# Patient Record
Sex: MALE | Race: WHITE | NOT HISPANIC OR LATINO | Employment: FULL TIME | URBAN - METROPOLITAN AREA
[De-identification: names, ages, dates, MRNs, and addresses within clinical notes are randomized per-mention and may not be internally consistent; named-entity substitution may affect disease eponyms.]

---

## 2020-02-19 ENCOUNTER — OFFICE VISIT (OUTPATIENT)
Dept: INTERNAL MEDICINE CLINIC | Facility: CLINIC | Age: 35
End: 2020-02-19
Payer: COMMERCIAL

## 2020-02-19 VITALS
OXYGEN SATURATION: 97 % | DIASTOLIC BLOOD PRESSURE: 80 MMHG | HEART RATE: 86 BPM | BODY MASS INDEX: 32.06 KG/M2 | WEIGHT: 229 LBS | HEIGHT: 71 IN | SYSTOLIC BLOOD PRESSURE: 136 MMHG | RESPIRATION RATE: 16 BRPM | TEMPERATURE: 98.3 F

## 2020-02-19 DIAGNOSIS — K59.04 CHRONIC IDIOPATHIC CONSTIPATION: ICD-10-CM

## 2020-02-19 DIAGNOSIS — E66.9 OBESITY (BMI 30.0-34.9): ICD-10-CM

## 2020-02-19 DIAGNOSIS — Z00.00 ANNUAL PHYSICAL EXAM: Primary | ICD-10-CM

## 2020-02-19 DIAGNOSIS — F17.200 SMOKING: ICD-10-CM

## 2020-02-19 PROBLEM — K59.00 CONSTIPATION: Status: ACTIVE | Noted: 2020-02-19

## 2020-02-19 PROBLEM — Z88.0 PENICILLIN ALLERGY: Status: ACTIVE | Noted: 2020-02-19

## 2020-02-19 PROCEDURE — 3008F BODY MASS INDEX DOCD: CPT | Performed by: GENERAL ACUTE CARE HOSPITAL

## 2020-02-19 PROCEDURE — 99385 PREV VISIT NEW AGE 18-39: CPT | Performed by: GENERAL ACUTE CARE HOSPITAL

## 2020-02-19 PROCEDURE — 99214 OFFICE O/P EST MOD 30 MIN: CPT | Performed by: GENERAL ACUTE CARE HOSPITAL

## 2020-02-19 RX ORDER — DIPHENOXYLATE HYDROCHLORIDE AND ATROPINE SULFATE 2.5; .025 MG/1; MG/1
1 TABLET ORAL DAILY
COMMUNITY
End: 2020-08-31 | Stop reason: ALTCHOICE

## 2020-02-19 NOTE — PATIENT INSTRUCTIONS
For your constipation, eat more fiber, you could take over the counter colace (stool softener), fiber supplement  Drink plenty of water

## 2020-02-19 NOTE — PROGRESS NOTES
Assessment/Plan:  BMI Counseling: Body mass index is 32 39 kg/m²  The BMI is above normal  Nutrition recommendations include reducing intake of saturated and trans fat and reducing intake of cholesterol  Exercise recommendations include moderate physical activity 150 minutes/week  Smoking  Counseled on smoking cessation  He is still trying to get used to the new environment, many stresses, doesn't feel ready yet  Counseled him on the many options we have to help him stop smoking  Obesity (BMI 30 0-34  9)  Was able to lose 50+ ibs over the past 3 years  Counseled on diet and exercise  Constipation  BM 1-2 times/w  Drink plenty of water  Increase fiber intake  Take colace, fiber supplement  Annual physical exam  Declined flu shot  Received tetanus shot 8 years ago  Will check routine blood work  He mentions previous his thyroid number was abnormal so will check thyroid functio as well  Counseled on smoking cessation  Penicillin allergy  We talked about the chance of him growing out of it  I recommend get penicillin test from allergist  He'll think about it  Diagnoses and all orders for this visit:    Annual physical exam  -     HIV 1/2 AG-AB combo; Future  -     TSH, 3rd generation with Free T4 reflex; Future  -     CBC and differential; Future  -     Comprehensive metabolic panel; Future  -     Lipid panel; Future    Smoking    Obesity (BMI 30 0-34  9)    Chronic idiopathic constipation    Other orders  -     Cancel: TDAP VACCINE GREATER THAN OR EQUAL TO 8YO IM  -     Cancel: influenza vaccine, [id number], quadrivalent, 0 5 mL, preservative-free, for adult and pediatric patients 6 mos+ (AFLURIA, FLUARIX, FLULAVAL, FLUZONE)  -     multivitamin (THERAGRAN) TABS; Take 1 tablet by mouth daily          Subjective:      Patient ID: Bo Grubbs is a 30 y o  male  HPI    Here to establish care  No complain  Hasn't seen a doctor for years, wants to have blood work checked  Follows a keto diet  Working out a lot  3 years ago was 285Ibs now 229Ibs  Just moved here from [place] last Dec    Smokes 0 25ppd for 10y  Quitted for 6-7month last year then restarted due to stress  Bowel movement 1-2times/week  Thinks he is not taking enough fibers due to his diet  Hx penicillin allergy when he was a kid  Stayed in the hospital for several days  The following portions of the patient's history were reviewed and updated as appropriate: allergies, current medications, past family history, past medical history, past social history, past surgical history and problem list     Review of Systems   Constitutional: Negative for chills, fatigue and fever  HENT: Negative for congestion, nosebleeds, postnasal drip, sneezing, sore throat and trouble swallowing  Eyes: Negative for pain  Respiratory: Negative for cough, chest tightness, shortness of breath and wheezing  Cardiovascular: Negative for chest pain, palpitations and leg swelling  Gastrointestinal: Negative for abdominal pain, constipation, diarrhea, nausea and vomiting  Endocrine: Negative for cold intolerance, heat intolerance, polydipsia and polyuria  Genitourinary: Negative for difficulty urinating, dysuria, flank pain and hematuria  Musculoskeletal: Negative for arthralgias and myalgias  Skin: Negative for rash  Neurological: Negative for dizziness, tremors, weakness and headaches  Hematological: Does not bruise/bleed easily  Psychiatric/Behavioral: Negative for confusion and dysphoric mood  The patient is not nervous/anxious  Objective:      /80   Pulse 86   Temp 98 3 °F (36 8 °C) (Oral)   Resp 16   Ht 5' 10 5" (1 791 m)   Wt 104 kg (229 lb)   SpO2 97%   BMI 32 39 kg/m²          Physical Exam   Constitutional: He is oriented to person, place, and time  He appears well-developed and well-nourished  No distress  HENT:   Head: Normocephalic and atraumatic     Right Ear: External ear normal  Left Ear: External ear normal    Eyes: Conjunctivae are normal  No scleral icterus  Neck: Normal range of motion  Neck supple  No tracheal deviation present  No thyromegaly present  Cardiovascular: Normal rate, regular rhythm and normal heart sounds  Pulmonary/Chest: Effort normal and breath sounds normal  No respiratory distress  He has no wheezes  He has no rales  Abdominal: Soft  Bowel sounds are normal  There is no tenderness  There is no rebound and no guarding  Musculoskeletal: He exhibits no edema  Lymphadenopathy:     He has no cervical adenopathy  Neurological: He is alert and oriented to person, place, and time  Psychiatric: He has a normal mood and affect   His behavior is normal  Judgment and thought content normal

## 2020-02-19 NOTE — ASSESSMENT & PLAN NOTE
We talked about the chance of him growing out of it  I recommend get penicillin test from allergist  He'll think about it

## 2020-02-19 NOTE — ASSESSMENT & PLAN NOTE
Declined flu shot  Received tetanus shot 8 years ago  Will check routine blood work  He mentions previous his thyroid number was abnormal so will check thyroid functio as well  Counseled on smoking cessation

## 2020-02-19 NOTE — ASSESSMENT & PLAN NOTE
Counseled on smoking cessation  He is still trying to get used to the new environment, many stresses, doesn't feel ready yet  Counseled him on the many options we have to help him stop smoking

## 2020-02-20 LAB — EXTERNAL HIV SCREEN: NORMAL

## 2020-03-03 ENCOUNTER — TELEPHONE (OUTPATIENT)
Dept: INTERNAL MEDICINE CLINIC | Facility: CLINIC | Age: 35
End: 2020-03-03

## 2020-03-03 NOTE — TELEPHONE ENCOUNTER
I only see the result of HIV that was automatically released on 2/21 to [facility]  I am still waiting to see the rest of the labs I ordered (CBC, TSH, CMP, lipid, etc)  They appear as incomplete

## 2020-03-03 NOTE — TELEPHONE ENCOUNTER
The patient is looking for his lab work results from 2/20/20  The results were put into his chart under media  Would you mind reviewing these? Please advise  Thank you

## 2020-03-03 NOTE — TELEPHONE ENCOUNTER
Pls disregard the previous message  The rest of the labs were done in outside lab so I didn't see it showing up in chart  I'll have MA post it in his mychart  Thank you

## 2020-03-03 NOTE — TELEPHONE ENCOUNTER
Patient is requesting test results for labs done on 2/20  Pt would like results released to My Chart  Thank you

## 2020-03-03 NOTE — TELEPHONE ENCOUNTER
I have replied to him in MyChart of my interpretation of the labs, and sent msg for MA to send the result to him  He needs to come to see me to discuss his high cholesterol

## 2020-03-04 ENCOUNTER — OFFICE VISIT (OUTPATIENT)
Dept: INTERNAL MEDICINE CLINIC | Facility: CLINIC | Age: 35
End: 2020-03-04
Payer: COMMERCIAL

## 2020-03-04 VITALS
WEIGHT: 227.2 LBS | BODY MASS INDEX: 31.81 KG/M2 | SYSTOLIC BLOOD PRESSURE: 126 MMHG | HEIGHT: 71 IN | OXYGEN SATURATION: 95 % | HEART RATE: 75 BPM | DIASTOLIC BLOOD PRESSURE: 76 MMHG

## 2020-03-04 DIAGNOSIS — E78.00 HYPERCHOLESTEROLEMIA: Primary | ICD-10-CM

## 2020-03-04 PROCEDURE — 99213 OFFICE O/P EST LOW 20 MIN: CPT | Performed by: GENERAL ACUTE CARE HOSPITAL

## 2020-03-04 PROCEDURE — 3008F BODY MASS INDEX DOCD: CPT | Performed by: GENERAL ACUTE CARE HOSPITAL

## 2020-03-04 NOTE — PROGRESS NOTES
Assessment/Plan:    Hypercholesterolemia  , most likely from his keto diet  Counseled pt on the consequences of hypercholesterolemia  Unclear family history  We talked about cutting down eggs, cheese, red meat  Continue exercise  Recheck in 3 months  Also briefly discussed medication options  He is not interested in seeing a dietitian  Diagnoses and all orders for this visit:    Hypercholesterolemia  -     Lipid panel; Future          Subjective:      Patient ID: Jayne Duque is a 30 y o  male  HPI  Here to discuss lab result    He follows a keto diet, eats 50 eggs a week, lots of cheese, dairy, red meat  Unclear family history of hyperlipidemia  The following portions of the patient's history were reviewed and updated as appropriate: allergies, current medications, past family history, past medical history, past social history, past surgical history and problem list     Review of Systems    As above  Objective:      /76   Pulse 75   Ht 5' 10 5" (1 791 m)   Wt 103 kg (227 lb 3 2 oz)   SpO2 95%   BMI 32 14 kg/m²          Physical Exam   Constitutional: He is oriented to person, place, and time  He appears well-developed and well-nourished  No distress  HENT:   Head: Normocephalic and atraumatic  Right Ear: External ear normal    Left Ear: External ear normal    Eyes: Conjunctivae are normal  No scleral icterus  Neck: Normal range of motion  Neck supple  No tracheal deviation present  No thyromegaly present  Cardiovascular: Normal rate, regular rhythm and normal heart sounds  Pulmonary/Chest: Effort normal and breath sounds normal  No respiratory distress  He has no wheezes  He has no rales  Abdominal: Soft  Bowel sounds are normal  There is no tenderness  There is no rebound and no guarding  Musculoskeletal: He exhibits no edema  Lymphadenopathy:     He has no cervical adenopathy     Neurological: He is alert and oriented to person, place, and time  Psychiatric: He has a normal mood and affect   His behavior is normal  Judgment and thought content normal

## 2020-08-18 RX ORDER — ACYCLOVIR 200 MG/1
200 CAPSULE ORAL
COMMUNITY
End: 2020-08-31 | Stop reason: ALTCHOICE

## 2020-08-18 RX ORDER — CIPROFLOXACIN 500 MG/1
500 TABLET, FILM COATED ORAL
COMMUNITY
End: 2020-08-31 | Stop reason: ALTCHOICE

## 2020-08-20 ENCOUNTER — APPOINTMENT (OUTPATIENT)
Dept: RADIOLOGY | Facility: AMBULARY SURGERY CENTER | Age: 35
End: 2020-08-20
Attending: ORTHOPAEDIC SURGERY
Payer: COMMERCIAL

## 2020-08-20 VITALS
HEART RATE: 64 BPM | HEIGHT: 71 IN | WEIGHT: 219 LBS | BODY MASS INDEX: 30.66 KG/M2 | SYSTOLIC BLOOD PRESSURE: 111 MMHG | DIASTOLIC BLOOD PRESSURE: 70 MMHG

## 2020-08-20 DIAGNOSIS — M54.50 CHRONIC LOW BACK PAIN WITHOUT SCIATICA, UNSPECIFIED BACK PAIN LATERALITY: Primary | ICD-10-CM

## 2020-08-20 DIAGNOSIS — G89.29 CHRONIC LOW BACK PAIN WITHOUT SCIATICA, UNSPECIFIED BACK PAIN LATERALITY: Primary | ICD-10-CM

## 2020-08-20 DIAGNOSIS — R10.2 PELVIC PAIN: ICD-10-CM

## 2020-08-20 DIAGNOSIS — S76.111A STRAIN OF RIGHT QUADRICEPS, INITIAL ENCOUNTER: ICD-10-CM

## 2020-08-20 PROCEDURE — 99203 OFFICE O/P NEW LOW 30 MIN: CPT | Performed by: ORTHOPAEDIC SURGERY

## 2020-08-20 PROCEDURE — 72170 X-RAY EXAM OF PELVIS: CPT

## 2020-08-20 PROCEDURE — 3008F BODY MASS INDEX DOCD: CPT | Performed by: ORTHOPAEDIC SURGERY

## 2020-08-20 NOTE — PROGRESS NOTES
Assessment/Plan:  1  Chronic low back pain without sciatica, unspecified back pain laterality  Ambulatory referral to Physical Therapy   2  Pelvic pain  XR pelvis ap only 1 or 2 vw   3  Strain of right quadriceps, initial encounter  Ambulatory referral to Physical Therapy     Patient Active Problem List   Diagnosis    Smoking    Obesity (BMI 30 0-34  9)    Constipation    Annual physical exam    Penicillin allergy    Hypercholesterolemia    Strain of right quadriceps    Chronic low back pain without sciatica       Discussion/Summary:    37 y o  male with right rectus femoris strain secondary to sprinting injury, based on XR standing AP pelvis he has pelvic tilt and lumbosacral degenerative changes resulting in pelvic tilt causing his recurrent strain of hip flexors  Will refer him to PT to work ideally with a manual PT  Follow up PRN, may benefit from spine and pain referral in the future  Advised that he modify his weight lifting including less or limited exercises heavily loading the spine such as squats or overhead cleans  The patient was seen and examined by Dr Bishop Smith and myself  The assessment and plan were formulated by Dr Bishop Smith and I assisted in carrying it out  Subjective:   Patient ID: Angela Zhou is a 37 y o  male   HPI    Patient presents to the office complaining of  Right thigh pain  Symptoms began  Almost 2 weeks ago as he was running sprinting he was playing softball had the hip extended as he was going to flex the hip had sharp pain in the anterior proximal thigh  Pain is located  Anterior thigh  Pain is described as sharp, intermittent  The pain does not radiate   The pain is mild to moderate  It is made worse by hip flexion  It is made better by rest  So far the patient has tried rest and heat  The patient  has had past episodes similar to this  The patient denies any numbness or tingling      The following portions of the patient's history were reviewed and updated as appropriate: allergies, current medications, past family history, past social history, past surgical history and problem list     Social History     Socioeconomic History    Marital status: Single     Spouse name: Not on file    Number of children: Not on file    Years of education: Not on file    Highest education level: Not on file   Occupational History    Not on file   Social Needs    Financial resource strain: Not on file    Food insecurity     Worry: Not on file     Inability: Not on file    Transportation needs     Medical: Not on file     Non-medical: Not on file   Tobacco Use    Smoking status: Current Every Day Smoker     Packs/day: 0 25     Types: Cigarettes    Smokeless tobacco: Current User     Types: Chew    Tobacco comment: for 10y   Substance and Sexual Activity    Alcohol use: Yes     Frequency: Monthly or less     Drinks per session: 3 or 4    Drug use: Never    Sexual activity: Yes   Lifestyle    Physical activity     Days per week: Not on file     Minutes per session: Not on file    Stress: Not on file   Relationships    Social connections     Talks on phone: Not on file     Gets together: Not on file     Attends Jewish service: Not on file     Active member of club or organization: Not on file     Attends meetings of clubs or organizations: Not on file     Relationship status: Not on file    Intimate partner violence     Fear of current or ex partner: Not on file     Emotionally abused: Not on file     Physically abused: Not on file     Forced sexual activity: Not on file   Other Topics Concern    Not on file   Social History Narrative    Not on file     Past Medical History:   Diagnosis Date    Anxiety     Depression      Past Surgical History:   Procedure Laterality Date    HAND SURGERY Right 08/2005    Surgery on broken right thumb        Allergies   Allergen Reactions    Penicillins      Current Outpatient Medications on File Prior to Visit   Medication Sig Dispense Refill    acyclovir (ZOVIRAX) 200 mg capsule Take 200 mg by mouth      ciprofloxacin (CIPRO) 500 mg tablet Take 500 mg by mouth      multivitamin (THERAGRAN) TABS Take 1 tablet by mouth daily       No current facility-administered medications on file prior to visit  Review of Systems   Constitutional: Negative for chills, fever and unexpected weight change  HENT: Negative for hearing loss, nosebleeds and sore throat  Eyes: Negative for pain, redness and visual disturbance  Respiratory: Negative for cough, shortness of breath and wheezing  Cardiovascular: Negative for chest pain, palpitations and leg swelling  Gastrointestinal: Negative for abdominal pain, nausea and vomiting  Endocrine: Negative for polydipsia and polyuria  Genitourinary: Negative for dysuria and hematuria  Musculoskeletal:          As noted in HPI   Skin: Negative for rash and wound  Neurological: Negative for dizziness, numbness and headaches  Psychiatric/Behavioral: Negative for decreased concentration, dysphoric mood and suicidal ideas  The patient is not nervous/anxious  Objective:    Vitals:    08/20/20 1410   BP: 111/70   Pulse: 64       Physical Exam  Constitutional:       General: He is not in acute distress  Appearance: He is well-developed  HENT:      Head: Normocephalic and atraumatic  Eyes:      General: No scleral icterus  Conjunctiva/sclera: Conjunctivae normal    Neck:      Musculoskeletal: Neck supple  Trachea: No tracheal deviation  Cardiovascular:      Comments: No discernible arrhthymias   Pulmonary:      Effort: Pulmonary effort is normal  No respiratory distress  Skin:     General: Skin is warm and dry  Neurological:      Mental Status: He is alert and oriented to person, place, and time     Psychiatric:         Behavior: Behavior normal          Right Hip Exam     Tenderness   Right hip tenderness location: tenderness over lumbar spine skin intact     Range of Motion   The patient has normal right hip ROM  Muscle Strength   The patient has normal right hip strength  Tests   HILARY: negative  Franck: negative    Other   Erythema: absent  Scars: absent  Sensation: normal  Pulse: present    Comments:  Pain but no weakness with resisted SLR  Apparent leg length discrepancy with right leg worse than left            I have personally reviewed pertinent films in PACS and my interpretation is XR of ap pelvis shows pelvic tilting to the left, lumboscaral degenerative changes are visualized as well  No lytic or blastic lesions no acute fractures    Procedures  No Procedures performed today    Portions of the record may have been created with voice recognition software  Occasional wrong word or "sound a like" substitutions may have occurred due to the inherent limitations of voice recognition software  Read the chart carefully and recognize, using context, where substitutions have occurred

## 2020-08-21 ENCOUNTER — TRANSCRIBE ORDERS (OUTPATIENT)
Dept: LAB | Facility: CLINIC | Age: 35
End: 2020-08-21

## 2020-08-21 ENCOUNTER — APPOINTMENT (OUTPATIENT)
Dept: LAB | Facility: CLINIC | Age: 35
End: 2020-08-21
Payer: COMMERCIAL

## 2020-08-21 DIAGNOSIS — E78.00 HYPERCHOLESTEROLEMIA: ICD-10-CM

## 2020-08-21 LAB
CHOLEST SERPL-MCNC: 134 MG/DL (ref 50–200)
HDLC SERPL-MCNC: 46 MG/DL
LDLC SERPL CALC-MCNC: 79 MG/DL (ref 0–100)
NONHDLC SERPL-MCNC: 88 MG/DL
TRIGL SERPL-MCNC: 46 MG/DL

## 2020-08-21 PROCEDURE — 80061 LIPID PANEL: CPT

## 2020-08-21 PROCEDURE — 36415 COLL VENOUS BLD VENIPUNCTURE: CPT

## 2020-08-31 ENCOUNTER — OFFICE VISIT (OUTPATIENT)
Dept: INTERNAL MEDICINE CLINIC | Facility: CLINIC | Age: 35
End: 2020-08-31
Payer: COMMERCIAL

## 2020-08-31 VITALS
BODY MASS INDEX: 30.6 KG/M2 | DIASTOLIC BLOOD PRESSURE: 70 MMHG | HEART RATE: 87 BPM | HEIGHT: 71 IN | SYSTOLIC BLOOD PRESSURE: 118 MMHG | TEMPERATURE: 40.6 F | WEIGHT: 218.6 LBS | OXYGEN SATURATION: 98 %

## 2020-08-31 DIAGNOSIS — F17.200 SMOKING: ICD-10-CM

## 2020-08-31 DIAGNOSIS — K59.04 CHRONIC IDIOPATHIC CONSTIPATION: Primary | ICD-10-CM

## 2020-08-31 DIAGNOSIS — E78.00 HYPERCHOLESTEROLEMIA: ICD-10-CM

## 2020-08-31 PROBLEM — Z00.00 ANNUAL PHYSICAL EXAM: Status: RESOLVED | Noted: 2020-02-19 | Resolved: 2020-08-31

## 2020-08-31 PROCEDURE — 3008F BODY MASS INDEX DOCD: CPT | Performed by: GENERAL ACUTE CARE HOSPITAL

## 2020-08-31 PROCEDURE — 99213 OFFICE O/P EST LOW 20 MIN: CPT | Performed by: GENERAL ACUTE CARE HOSPITAL

## 2020-08-31 NOTE — ASSESSMENT & PLAN NOTE
Recommend a combination of nicotine replacement since he had success with it before  F/u to consider medication if NRT doesn't work

## 2020-08-31 NOTE — ASSESSMENT & PLAN NOTE
Counseled on high fiber diet, water, fruit vegetables  Try colace daily, senna as needed  No red flag sign  F/u in next visit

## 2020-08-31 NOTE — PROGRESS NOTES
Assessment/Plan:    Smoking  Recommend a combination of nicotine replacement since he had success with it before  F/u to consider medication if NRT doesn't work  Constipation  Counseled on high fiber diet, water, fruit vegetables  Try colace daily, senna as needed  No red flag sign  F/u in next visit  Hypercholesterolemia  Cut down eggs  Not on keto diet any more  Resolved  Diagnoses and all orders for this visit:    Chronic idiopathic constipation    Smoking    Hypercholesterolemia          Subjective:      Patient ID: Jazmín Hartley is a 37 y o  male  HPI     Constipation: for 4 years  Has BM 2-3 times/ weeks  Stool is hard in the beginning then becomes soft  Needs to strain a little  Feels some abd fullness on the day before BM  Drinks 120oz water  Eats blueberries daily  Young brother has UC  Smoking: now smokes 3pack /w, used to smoke 1ppd  Had stopped before with nicotine patch for 6months then relapses  The following portions of the patient's history were reviewed and updated as appropriate: allergies, past family history, past social history and past surgical history  Review of Systems   Constitutional: Negative for chills, fatigue and fever  HENT: Negative for congestion, nosebleeds, postnasal drip, sneezing, sore throat and trouble swallowing  Eyes: Negative for pain  Respiratory: Negative for cough, chest tightness, shortness of breath and wheezing  Cardiovascular: Negative for chest pain, palpitations and leg swelling  Gastrointestinal: Positive for constipation  Negative for abdominal pain, diarrhea, nausea and vomiting  Endocrine: Negative for cold intolerance, heat intolerance, polydipsia and polyuria  Genitourinary: Negative for difficulty urinating, dysuria, flank pain and hematuria  Musculoskeletal: Negative for arthralgias and myalgias  Skin: Negative for rash  Neurological: Negative for dizziness, tremors, weakness and headaches  Hematological: Does not bruise/bleed easily  Psychiatric/Behavioral: Negative for confusion and dysphoric mood  The patient is not nervous/anxious  Objective:      /70   Pulse 87   Temp (!) 40 6 °F (4 8 °C)   Ht 5' 10 5" (1 791 m)   Wt 99 2 kg (218 lb 9 6 oz)   SpO2 98%   BMI 30 92 kg/m²          Physical Exam  Constitutional:       General: He is not in acute distress  Appearance: He is well-developed  HENT:      Head: Normocephalic and atraumatic  Right Ear: External ear normal       Left Ear: External ear normal    Eyes:      General: No scleral icterus  Conjunctiva/sclera: Conjunctivae normal    Neck:      Musculoskeletal: Normal range of motion and neck supple  Thyroid: No thyromegaly  Trachea: No tracheal deviation  Cardiovascular:      Rate and Rhythm: Normal rate and regular rhythm  Heart sounds: Normal heart sounds  Pulmonary:      Effort: Pulmonary effort is normal  No respiratory distress  Breath sounds: Normal breath sounds  No wheezing or rales  Abdominal:      General: Bowel sounds are normal       Palpations: Abdomen is soft  Tenderness: There is no abdominal tenderness  There is no guarding or rebound  Lymphadenopathy:      Cervical: No cervical adenopathy  Neurological:      Mental Status: He is alert and oriented to person, place, and time  Psychiatric:         Behavior: Behavior normal          Thought Content:  Thought content normal          Judgment: Judgment normal

## 2020-09-15 ENCOUNTER — EVALUATION (OUTPATIENT)
Dept: PHYSICAL THERAPY | Facility: REHABILITATION | Age: 35
End: 2020-09-15
Payer: COMMERCIAL

## 2020-09-15 DIAGNOSIS — S76.111D STRAIN OF RIGHT QUADRICEPS, SUBSEQUENT ENCOUNTER: ICD-10-CM

## 2020-09-15 DIAGNOSIS — G89.29 CHRONIC MIDLINE LOW BACK PAIN WITHOUT SCIATICA: ICD-10-CM

## 2020-09-15 DIAGNOSIS — M54.50 CHRONIC MIDLINE LOW BACK PAIN WITHOUT SCIATICA: ICD-10-CM

## 2020-09-15 PROCEDURE — 97162 PT EVAL MOD COMPLEX 30 MIN: CPT | Performed by: PHYSICAL THERAPIST

## 2020-09-15 NOTE — PROGRESS NOTES
PT Evaluation     Today's date: 9/15/2020  Patient name: Luz Elena Goldman  : 1985  MRN: [id number]  Referring provider: Nirali Arriaga  Dx:   Encounter Diagnosis     ICD-10-CM    1  Strain of right quadriceps, subsequent encounter  S76 111D Ambulatory referral to Physical Therapy   2  Chronic midline low back pain without sciatica  M54 5 Ambulatory referral to Physical Therapy    G89 29        Start Time: 1630  Stop Time: 1735  Total time in clinic (min): 65 minutes    Assessment  Assessment details: Luz Elena Goldman is a 37 y o  male present with:   Strain of right quadriceps, subsequent encounter  Chronic midline low back pain without sciatica    Ekaterina Redding has the above listed impairments and will benefit from skilled Physical Therapist management to improve deficits to return to prior level of function  Colin's history of chronic right quadriceps strain is due to lack of efficacious treatment and hip extension range of motion resulting in his compensation of anterior pelvic tilting     Impairments: abnormal muscle firing, abnormal or restricted ROM, activity intolerance, impaired physical strength, lacks appropriate home exercise program and pain with function    Symptom irritability: moderateUnderstanding of Dx/Px/POC: good   Prognosis: good    Goals  Impairment Goals  - Decrease pain 0/10  - Improve ROM to 120 degrees knee flexion during ely's test  - Increase strength to 5/5 throughout    Functional Goals  - Return to Prior Level of Function  - Increase Functional Status Measure to: 76  - Patient will be independent with HEP  -Patient will be able to return to running without pain    Plan  Patient would benefit from: skilled PT  Planned therapy interventions: joint mobilization, manual therapy, patient education, postural training, activity modification, abdominal trunk stabilization, body mechanics training, flexibility, functional ROM exercises, graded exercise, home exercise program, neuromuscular re-education, strengthening, stretching, therapeutic activities and therapeutic exercise  Frequency: 2x week  Duration in weeks: 8  Plan of Care beginning date: 9/15/2020  Plan of Care expiration date: 11/10/2020  Treatment plan discussed with: patient        Subjective Evaluation    History of Present Illness  Mechanism of injury: trauma  Mechanism of injury: Dione Peteite reports chronic strain history of right quadriceps and left hamstring  Notes during college the [employer] performed ice, stim and compression wrapping, however no strengthening or loading of the strained structures  Essentially no efficacious treatment during thattime  Does note PMH of right calf partial tear, right pec grade 2, grade 2 triceps strain  Denies abnormal blood chemistry from PCP testing, Supplements taken for nutrition BCAA's and whey protein  Recurrent probem    Pain  Current pain ratin  At worst pain ratin  Location: R anterior hip and L hamstring  Quality: dull ache, sharp and tight  Aggravating factors: lifting, running and stair climbing  Progression: no change    Social Support    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[employer])  Patient Goals  Patient goals for therapy: decreased pain, increased motion, increased strength and return to sport/leisure activities  Patient goal: softball        Objective     Palpation   Left   No palpable tenderness to the erector spinae  Right   No palpable tenderness to the erector spinae  Neurological Testing     Sensation     Lumbar   Left   Intact: light touch    Right   Intact: light touch    Active Range of Motion     Lumbar   Flexion:  WFL  Extension: 5 degrees   Left rotation:  with pain Restriction level: moderate  Right rotation:  Restriction level: moderate    Additional Active Range of Motion Details  Pain with left rotation patient reported due to pulling his left lat while returning a weight last week at the gym       Joint Play Hypermobile: L2, L3, L4, L5 and S1     Hypomobile: L3     Pain: L2, L3, L4, L5 and S1     Strength/Myotome Testing     Lumbar   Left   Heel walk: normal  Toe walk: normal    Right   Heel walk: normal  Toe walk: normal    Left Hip   Planes of Motion   Flexion: WFL  Extension: 4-  Abduction: 4-    Right Hip   Planes of Motion   Flexion: 4- (p)  Extension: 4+  Abduction: 4-    Left Knee   Flexion: WFL  Extension: WFL    Right Knee   Flexion: WFL  Extension: 4    Left Ankle/Foot   Dorsiflexion: WFL  Plantar flexion: WFL  Right Ankle/Foot   Dorsiflexion: WFL  Plantar flexion: WFL  Tests     Lumbar   Positive prone instability   Left   Negative crossed SLR and passive SLR  Right   Negative crossed SLR and passive SLR  Left Hip   Positive HILARY and long sit  Right Hip   Negative HILARY  Additional Tests Details  Right leg length discrepancy evident  Measurement from tibial tuberosity to medial malleoli revealed 0 5" discrepency with right being slightly longer       +Ely's with pain on right at 110* vs left 120* without pain    +Nelson left       Flowsheet Rows      Most Recent Value   PT/OT G-Codes   Current Score  67   Projected Score  76             Precautions: history of strains and partial tears      Daily Treatment Diary      Manual 09/15/20             IASTM R hip flexor/quad nv                         Therapeutic Ex                    bike nv                   SLR abd* 3x10            SLR flx nv            LAQ w weight and hold nv                                      Neuro Re-ed                          ADIM* 10x5"                   ADIM marches* 2x8 ea                   ADIM heel taps                    Bridges* 3x15 add wt nv                   Supine abd brace against ball nv            Plank   nv                  Plank w LE lift   nv                              Therapeutic Activity                          Leg Press                    Goblet squat                    Pallof Press                                                                                                                                                                                                                                     Modalities                                                    *=on HEP

## 2020-09-22 ENCOUNTER — OFFICE VISIT (OUTPATIENT)
Dept: PHYSICAL THERAPY | Facility: REHABILITATION | Age: 35
End: 2020-09-22
Payer: COMMERCIAL

## 2020-09-22 DIAGNOSIS — S76.111D STRAIN OF RIGHT QUADRICEPS, SUBSEQUENT ENCOUNTER: Primary | ICD-10-CM

## 2020-09-22 DIAGNOSIS — G89.29 CHRONIC MIDLINE LOW BACK PAIN WITHOUT SCIATICA: ICD-10-CM

## 2020-09-22 DIAGNOSIS — M54.50 CHRONIC MIDLINE LOW BACK PAIN WITHOUT SCIATICA: ICD-10-CM

## 2020-09-22 PROCEDURE — 97110 THERAPEUTIC EXERCISES: CPT | Performed by: PHYSICAL THERAPIST

## 2020-09-22 PROCEDURE — 97140 MANUAL THERAPY 1/> REGIONS: CPT | Performed by: PHYSICAL THERAPIST

## 2020-09-22 PROCEDURE — 97112 NEUROMUSCULAR REEDUCATION: CPT | Performed by: PHYSICAL THERAPIST

## 2020-09-22 NOTE — PROGRESS NOTES
Daily Note     Today's date: 2020  Patient name: Topher Louise  : 1985  MRN: [id number]  Referring provider: Gabino Boland PA-C  Dx:   Encounter Diagnosis     ICD-10-CM    1  Strain of right quadriceps, subsequent encounter  S76 111D    2  Chronic midline low back pain without sciatica  M54 5     G89 29        Start Time: 1520  Stop Time: 1605  Total time in clinic (min): 45 minutes    Subjective: De Estrada reports that his hip feels about the same, "maybe a little better " Notes playing softball, but refraining from running  Objective: See treatment diary below      Assessment: Tolerated treatment well  Patient demonstrated fatigue post treatment, exhibited good technique with therapeutic exercises and would benefit from continued PT  right hip pain with heel taps, reduced with hip IR  Tolerated additional exercises well  Plan: Continue per plan of care           Precautions: history of strains and partial tears      Daily Treatment Diary      Manual 09/15/20  9/22           IASTM R hip flexor/quad nv 8'                        Therapeutic Ex                    bike nv 5' lvl 1                  SLR abd* 3x10 3x10           SLR flx nv nv           LAQ w weight and hold nv 2x12x5" 4#                                     Neuro Re-ed                          ADIM* 10x5"                   ADIM marches* 2x8 ea 3x10                  ADIM heel taps*  3x8 w hip IR                  Bridges* w march 3x15 add wt nv 3x8 ea                  Supine abdominal brace against ball nv 2x6x5"           Plank   nv                  Plank w LE lift   nv                              Therapeutic Activity                          Leg Press                    Goblet squat                    Pallof Press                                                                                                                 Modalities                                                    *=on HEP

## 2020-09-25 ENCOUNTER — OFFICE VISIT (OUTPATIENT)
Dept: PHYSICAL THERAPY | Facility: REHABILITATION | Age: 35
End: 2020-09-25
Payer: COMMERCIAL

## 2020-09-25 DIAGNOSIS — S76.111D STRAIN OF RIGHT QUADRICEPS, SUBSEQUENT ENCOUNTER: Primary | ICD-10-CM

## 2020-09-25 DIAGNOSIS — G89.29 CHRONIC MIDLINE LOW BACK PAIN WITHOUT SCIATICA: ICD-10-CM

## 2020-09-25 DIAGNOSIS — M54.50 CHRONIC MIDLINE LOW BACK PAIN WITHOUT SCIATICA: ICD-10-CM

## 2020-09-25 PROCEDURE — 97112 NEUROMUSCULAR REEDUCATION: CPT

## 2020-09-25 PROCEDURE — 97140 MANUAL THERAPY 1/> REGIONS: CPT

## 2020-09-25 PROCEDURE — 97110 THERAPEUTIC EXERCISES: CPT

## 2020-09-29 ENCOUNTER — OFFICE VISIT (OUTPATIENT)
Dept: PHYSICAL THERAPY | Facility: REHABILITATION | Age: 35
End: 2020-09-29
Payer: COMMERCIAL

## 2020-09-29 DIAGNOSIS — G89.29 CHRONIC MIDLINE LOW BACK PAIN WITHOUT SCIATICA: ICD-10-CM

## 2020-09-29 DIAGNOSIS — M54.50 CHRONIC MIDLINE LOW BACK PAIN WITHOUT SCIATICA: ICD-10-CM

## 2020-09-29 DIAGNOSIS — S76.111D STRAIN OF RIGHT QUADRICEPS, SUBSEQUENT ENCOUNTER: Primary | ICD-10-CM

## 2020-09-29 PROCEDURE — 97112 NEUROMUSCULAR REEDUCATION: CPT | Performed by: PHYSICAL THERAPIST

## 2020-09-29 PROCEDURE — 97140 MANUAL THERAPY 1/> REGIONS: CPT | Performed by: PHYSICAL THERAPIST

## 2020-09-29 PROCEDURE — 97110 THERAPEUTIC EXERCISES: CPT | Performed by: PHYSICAL THERAPIST

## 2020-09-29 NOTE — PROGRESS NOTES
Daily Note     Today's date: 2020  Patient name: James Peña  : 1985  MRN: [id number]  Referring provider: Mercy Sandoval PA-C  Dx:   Encounter Diagnosis     ICD-10-CM    1  Strain of right quadriceps, subsequent encounter  S76 111D    2  Chronic midline low back pain without sciatica  M54 5     G89 29        Start Time: 1618  Stop Time: 1703  Total time in clinic (min): 45 minutes    Subjective: Patient with no complaints after last session stating "it feels good " Patient reports he was able to jog but can still feel a pulling in his quad if he tried to run  Objective: See treatment diary below      Assessment: Tolerated treatment well  Patient demonstrated fatigue post treatment, exhibited good technique with therapeutic exercises and would benefit from continued PT  Initiated SLR flexion and planks with LE lifts with patient tolerating well, however patient does note "pulling" with loading right leg during planks lifts  Progressed in terms of increased repetitions this visit  Plan: Continue per plan of care        Precautions: history of strains and partial tears      Daily Treatment Diary      Manual 09/15/20  9/22 9/25 9/29         IASTM R hip flexor/quad nv 8' 8' 8'                      Therapeutic Ex                    bike nv 5' lvl 1 5' lvl 1  5' lvl 1               SLR abd* 3x10 3x10 3x12 3x12 ea         SLR flx nv nv nv 3x10 ea         LAQ w weight and hold nv 2x12x5" 4# 3x10x5'' 4# 3x12x5" 4#                                   Neuro Re-ed                          ADIM* 10x5"                   ADIM marches* 2x8 ea 3x10 3x12  3x12               ADIM heel taps*  3x8 w hip IR 3x10 w hip IR  3x10 w hip IR               Bridges* w march 3x15 add wt nv 3x8 ea 3x10 ea   3x12 ea               Supine abdominal brace against ball nv 2x6x5" 2x6x5'' Green 2x6x5"         Plank   nv :30x3  :30x3               Plank w LE lift   nv  :05x5 ea                            Therapeutic Activity Leg Press                    Goblet squat                    Pallof Press                                                                                                                 Modalities                                                    *=on HEP

## 2020-10-01 ENCOUNTER — OFFICE VISIT (OUTPATIENT)
Dept: PHYSICAL THERAPY | Facility: REHABILITATION | Age: 35
End: 2020-10-01
Payer: COMMERCIAL

## 2020-10-01 DIAGNOSIS — M54.50 CHRONIC MIDLINE LOW BACK PAIN WITHOUT SCIATICA: ICD-10-CM

## 2020-10-01 DIAGNOSIS — G89.29 CHRONIC MIDLINE LOW BACK PAIN WITHOUT SCIATICA: ICD-10-CM

## 2020-10-01 DIAGNOSIS — S76.111D STRAIN OF RIGHT QUADRICEPS, SUBSEQUENT ENCOUNTER: Primary | ICD-10-CM

## 2020-10-01 PROCEDURE — 97110 THERAPEUTIC EXERCISES: CPT

## 2020-10-01 PROCEDURE — 97140 MANUAL THERAPY 1/> REGIONS: CPT

## 2020-10-01 PROCEDURE — 97112 NEUROMUSCULAR REEDUCATION: CPT

## 2020-10-06 ENCOUNTER — OFFICE VISIT (OUTPATIENT)
Dept: PHYSICAL THERAPY | Facility: REHABILITATION | Age: 35
End: 2020-10-06
Payer: COMMERCIAL

## 2020-10-06 DIAGNOSIS — M54.50 CHRONIC MIDLINE LOW BACK PAIN WITHOUT SCIATICA: ICD-10-CM

## 2020-10-06 DIAGNOSIS — S76.111D STRAIN OF RIGHT QUADRICEPS, SUBSEQUENT ENCOUNTER: Primary | ICD-10-CM

## 2020-10-06 DIAGNOSIS — G89.29 CHRONIC MIDLINE LOW BACK PAIN WITHOUT SCIATICA: ICD-10-CM

## 2020-10-06 PROCEDURE — 97530 THERAPEUTIC ACTIVITIES: CPT

## 2020-10-06 PROCEDURE — 97110 THERAPEUTIC EXERCISES: CPT

## 2020-10-06 PROCEDURE — 97112 NEUROMUSCULAR REEDUCATION: CPT

## 2020-10-08 ENCOUNTER — APPOINTMENT (OUTPATIENT)
Dept: PHYSICAL THERAPY | Facility: REHABILITATION | Age: 35
End: 2020-10-08
Payer: COMMERCIAL

## 2021-02-23 ENCOUNTER — OFFICE VISIT (OUTPATIENT)
Dept: INTERNAL MEDICINE CLINIC | Facility: CLINIC | Age: 36
End: 2021-02-23
Payer: COMMERCIAL

## 2021-02-23 VITALS
HEART RATE: 76 BPM | TEMPERATURE: 96.6 F | WEIGHT: 228 LBS | SYSTOLIC BLOOD PRESSURE: 109 MMHG | BODY MASS INDEX: 31.92 KG/M2 | HEIGHT: 71 IN | DIASTOLIC BLOOD PRESSURE: 70 MMHG | OXYGEN SATURATION: 98 %

## 2021-02-23 DIAGNOSIS — E66.9 OBESITY (BMI 30.0-34.9): ICD-10-CM

## 2021-02-23 DIAGNOSIS — K59.04 CHRONIC IDIOPATHIC CONSTIPATION: ICD-10-CM

## 2021-02-23 DIAGNOSIS — Z00.00 ANNUAL PHYSICAL EXAM: Primary | ICD-10-CM

## 2021-02-23 DIAGNOSIS — F17.200 SMOKING: ICD-10-CM

## 2021-02-23 DIAGNOSIS — Z23 NEED FOR VACCINATION: ICD-10-CM

## 2021-02-23 PROCEDURE — 4004F PT TOBACCO SCREEN RCVD TLK: CPT | Performed by: GENERAL ACUTE CARE HOSPITAL

## 2021-02-23 PROCEDURE — 99395 PREV VISIT EST AGE 18-39: CPT | Performed by: GENERAL ACUTE CARE HOSPITAL

## 2021-02-23 PROCEDURE — 3008F BODY MASS INDEX DOCD: CPT | Performed by: GENERAL ACUTE CARE HOSPITAL

## 2021-02-23 PROCEDURE — 90471 IMMUNIZATION ADMIN: CPT

## 2021-02-23 PROCEDURE — 90715 TDAP VACCINE 7 YRS/> IM: CPT

## 2021-02-23 PROCEDURE — 3725F SCREEN DEPRESSION PERFORMED: CPT | Performed by: GENERAL ACUTE CARE HOSPITAL

## 2021-02-23 NOTE — ASSESSMENT & PLAN NOTE
Gained 10 pounds over the last 6 months  Exercise daily   on balanced diet and healthy weight     Refer to nutritionist

## 2021-02-23 NOTE — PROGRESS NOTES
[facility] [place] [facility]  [place]    NAME: Cristobal Galvez  AGE: 37 y o  SEX: male  : 1985     DATE: 2021     Assessment and Plan:     Problem List Items Addressed This Visit        Other    Smoking     Went from 1 PPD to 1-2 in the weekends  In progress   on nicotine gum use for cravings  Obesity (BMI 30 0-34  9)     Gained 10 pounds over the last 6 months  Exercise daily   on balanced diet and healthy weight  Refer to nutritionist           Constipation     Used to have 1 BM a week  Last week had 5 BM a week after introducing more fibers in diet  Improved  Continue balanced diet  Annual physical exam - Primary     Check CBC  CMP and lipid panel         Relevant Orders    Lipid panel    CBC and Platelet    Comprehensive metabolic panel      Other Visit Diagnoses     BMI 32 0-32 9,adult        Relevant Orders    Ambulatory referral to Nutrition Services          Immunizations and preventive care screenings were discussed with patient today  Appropriate education was printed on patient's after visit summary  Counseling:  Alcohol/drug use: discussed moderation in alcohol intake, the recommendations for healthy alcohol use, and avoidance of illicit drug use  Dental Health: discussed importance of regular tooth brushing, flossing, and dental visits  Injury prevention: discussed safety/seat belts, safety helmets, smoke detectors, carbon dioxide detectors, and smoking near bedding or upholstery  · Exercise: the importance of regular exercise/physical activity was discussed  Recommend exercise 3-5 times per week for at least 30 minutes  BMI Counseling: Body mass index is 32 25 kg/m²  The BMI is above normal  Nutrition recommendations include encouraging healthy choices of fruits and vegetables  Exercise recommendations include exercising 3-5 times per week  No pharmacotherapy was ordered   Patient referred to nutritionist due to patient being overweight  Depression Screening and Follow-up Plan: Patient's depression screening was positive with a PHQ-2 score of 4  Their PHQ-9 score was 7  Patient assessed for underlying major depression  Brief counseling provided and recommend additional follow-up/re-evaluation next office visit  Tobacco Cessation Counseling: Tobacco cessation counseling was provided  The patient is sincerely urged to quit consumption of tobacco  He is ready to quit tobacco  Medication options and side effects of medication not discussed  Patient agreed to medication  Return in 1 year (on 2022) for Annual physical      Chief Complaint:     Chief Complaint   Patient presents with    Annual Exam      History of Present Illness:     Adult Annual Physical   Patient here for a comprehensive physical exam  The patient reports no problems  Had COVID   Chills, loss of taste, low grade fever, headache, malaise  Had a couple cigarret in the weekends  Gain 10 pounds over the holidays  Go to the gym everyday and lifting weights  Diet and Physical Activity  · Diet/Nutrition: well balanced diet  · Exercise: 5-7 times a week on average  Depression Screening  PHQ-9 Depression Screening    PHQ-9:   Frequency of the following problems over the past two weeks:      Little interest or pleasure in doing things: 2 - more than half the days  Feeling down, depressed, or hopeless: 2 - more than half the days  Trouble falling or staying asleep, or sleeping too much: 1 - several days  Feeling tired or having little energy: 0 - not at all  Poor appetite or overeatin - not at all  Feeling bad about yourself - or that you are a failure or have let yourself or your family down: 2 - more than half the days  Trouble concentrating on things, such as reading the newspaper or watching television: 0 - not at all  Moving or speaking so slowly that other people could have noticed   Or the opposite - being so fidgety or restless that you have been moving around a lot more than usual: 0 - not at all  Thoughts that you would be better off dead, or of hurting yourself in some way: 0 - not at all  PHQ-2 Score: 4  PHQ-9 Score: 7       General Health  · Sleep: sleeps well  · Hearing: normal - bilateral   · Vision: no vision problems  · Dental: regular dental visits   Health  · History of STDs?: no      Review of Systems:     Review of Systems   Constitutional: Negative for chills and fever  HENT: Negative for ear pain and sore throat  Eyes: Negative for pain and visual disturbance  Respiratory: Negative for cough and shortness of breath  Cardiovascular: Negative for chest pain and palpitations  Gastrointestinal: Negative for abdominal pain and vomiting  Genitourinary: Negative for dysuria and hematuria  Musculoskeletal: Negative for arthralgias and back pain  Skin: Negative for color change and rash  Neurological: Negative for seizures and syncope  All other systems reviewed and are negative  Past Medical History:     Past Medical History:   Diagnosis Date    Anxiety     Depression       Past Surgical History:     Past Surgical History:   Procedure Laterality Date    HAND SURGERY Right 08/2005    Surgery on broken right thumb  Social History:        Social History     Socioeconomic History    Marital status: Single     Spouse name: None    Number of children: None    Years of education: None    Highest education level: None   Occupational History    None   Social Needs    Financial resource strain: None    Food insecurity     Worry: None     Inability: None    Transportation needs     Medical: None     Non-medical: None   Tobacco Use    Smoking status: Current Every Day Smoker     Packs/day: 0 25     Types: Cigarettes    Smokeless tobacco: Current User     Types: Chew    Tobacco comment: for 10y   Substance and Sexual Activity    Alcohol use:  Yes Frequency: Monthly or less     Drinks per session: 3 or 4    Drug use: Never    Sexual activity: Yes   Lifestyle    Physical activity     Days per week: None     Minutes per session: None    Stress: None   Relationships    Social connections     Talks on phone: None     Gets together: None     Attends [employer] service: None     Active member of club or organization: None     Attends meetings of clubs or organizations: None     Relationship status: None    Intimate partner violence     Fear of current or ex partner: None     Emotionally abused: None     Physically abused: None     Forced sexual activity: None   Other Topics Concern    None   Social History Narrative    None      Family History:     Family History   Problem Relation Age of Onset    Multiple sclerosis Mother     Breast cancer Mother     No Known Problems Father     No Known Problems Sister     Ulcerative colitis Brother     No Known Problems Brother     No Known Problems Brother       Current Medications:     No current outpatient medications on file  No current facility-administered medications for this visit  Allergies: Allergies   Allergen Reactions    Penicillins       Physical Exam:     /70   Pulse 76   Temp (!) 96 6 °F (35 9 °C) (Temporal)   Ht 5' 10 5" (1 791 m)   Wt 103 kg (228 lb)   SpO2 98%   BMI 32 25 kg/m²     Physical Exam  Vitals signs and nursing note reviewed  Constitutional:       Appearance: He is well-developed  HENT:      Head: Normocephalic and atraumatic  Eyes:      Conjunctiva/sclera: Conjunctivae normal    Neck:      Musculoskeletal: Neck supple  Cardiovascular:      Rate and Rhythm: Normal rate and regular rhythm  Heart sounds: No murmur  Pulmonary:      Effort: Pulmonary effort is normal  No respiratory distress  Breath sounds: Normal breath sounds  Abdominal:      Palpations: Abdomen is soft  Tenderness: There is no abdominal tenderness     Skin: General: Skin is warm and dry  Neurological:      Mental Status: He is alert            Georgette Diaz MD   [facility]

## 2021-02-23 NOTE — ASSESSMENT & PLAN NOTE
Used to have 1 BM a week  Last week had 5 BM a week after introducing more fibers in diet  Improved  Continue balanced diet

## 2021-02-23 NOTE — PATIENT INSTRUCTIONS

## 2021-02-26 ENCOUNTER — TRANSCRIBE ORDERS (OUTPATIENT)
Dept: LAB | Facility: CLINIC | Age: 36
End: 2021-02-26

## 2021-02-26 ENCOUNTER — LAB (OUTPATIENT)
Dept: LAB | Facility: CLINIC | Age: 36
End: 2021-02-26
Payer: COMMERCIAL

## 2021-02-26 DIAGNOSIS — E78.00 ELEVATED LDL CHOLESTEROL LEVEL: Primary | ICD-10-CM

## 2021-02-26 DIAGNOSIS — Z00.00 ANNUAL PHYSICAL EXAM: ICD-10-CM

## 2021-02-26 LAB
ALBUMIN SERPL BCP-MCNC: 4.5 G/DL (ref 3.5–5)
ALP SERPL-CCNC: 69 U/L (ref 46–116)
ALT SERPL W P-5'-P-CCNC: 23 U/L (ref 12–78)
ANION GAP SERPL CALCULATED.3IONS-SCNC: 11 MMOL/L (ref 4–13)
AST SERPL W P-5'-P-CCNC: 21 U/L (ref 5–45)
BILIRUB SERPL-MCNC: 0.49 MG/DL (ref 0.2–1)
BUN SERPL-MCNC: 18 MG/DL (ref 5–25)
CALCIUM SERPL-MCNC: 9.1 MG/DL (ref 8.3–10.1)
CHLORIDE SERPL-SCNC: 104 MMOL/L (ref 100–108)
CHOLEST SERPL-MCNC: 184 MG/DL (ref 50–200)
CO2 SERPL-SCNC: 26 MMOL/L (ref 21–32)
CREAT SERPL-MCNC: 1.28 MG/DL (ref 0.6–1.3)
ERYTHROCYTE [DISTWIDTH] IN BLOOD BY AUTOMATED COUNT: 13 % (ref 11.6–15.1)
GFR SERPL CREATININE-BSD FRML MDRD: 73 ML/MIN/1.73SQ M
GLUCOSE P FAST SERPL-MCNC: 73 MG/DL (ref 65–99)
HCT VFR BLD AUTO: 44.8 % (ref 36.5–49.3)
HDLC SERPL-MCNC: 16 MG/DL
HGB BLD-MCNC: 14.2 G/DL (ref 12–17)
LDLC SERPL CALC-MCNC: 156 MG/DL (ref 0–100)
MCH RBC QN AUTO: 28.8 PG (ref 26.8–34.3)
MCHC RBC AUTO-ENTMCNC: 31.7 G/DL (ref 31.4–37.4)
MCV RBC AUTO: 91 FL (ref 82–98)
NONHDLC SERPL-MCNC: 168 MG/DL
PLATELET # BLD AUTO: 226 THOUSANDS/UL (ref 149–390)
PMV BLD AUTO: 10.6 FL (ref 8.9–12.7)
POTASSIUM SERPL-SCNC: 4.3 MMOL/L (ref 3.5–5.3)
PROT SERPL-MCNC: 7.7 G/DL (ref 6.4–8.2)
RBC # BLD AUTO: 4.93 MILLION/UL (ref 3.88–5.62)
SODIUM SERPL-SCNC: 141 MMOL/L (ref 136–145)
TRIGL SERPL-MCNC: 60 MG/DL
WBC # BLD AUTO: 5.02 THOUSAND/UL (ref 4.31–10.16)

## 2021-02-26 PROCEDURE — 80061 LIPID PANEL: CPT

## 2021-02-26 PROCEDURE — 85027 COMPLETE CBC AUTOMATED: CPT

## 2021-02-26 PROCEDURE — 80053 COMPREHEN METABOLIC PANEL: CPT

## 2021-02-26 PROCEDURE — 36415 COLL VENOUS BLD VENIPUNCTURE: CPT

## 2021-03-30 DIAGNOSIS — Z23 ENCOUNTER FOR IMMUNIZATION: ICD-10-CM

## 2021-04-06 ENCOUNTER — IMMUNIZATIONS (OUTPATIENT)
Dept: FAMILY MEDICINE CLINIC | Facility: HOSPITAL | Age: 36
End: 2021-04-06

## 2021-04-06 DIAGNOSIS — Z23 ENCOUNTER FOR IMMUNIZATION: Primary | ICD-10-CM

## 2021-04-06 PROCEDURE — 0001A SARS-COV-2 / COVID-19 MRNA VACCINE (PFIZER-BIONTECH) 30 MCG: CPT

## 2021-04-06 PROCEDURE — 91300 SARS-COV-2 / COVID-19 MRNA VACCINE (PFIZER-BIONTECH) 30 MCG: CPT

## 2021-04-27 ENCOUNTER — IMMUNIZATIONS (OUTPATIENT)
Dept: FAMILY MEDICINE CLINIC | Facility: HOSPITAL | Age: 36
End: 2021-04-27

## 2021-04-27 DIAGNOSIS — Z23 ENCOUNTER FOR IMMUNIZATION: Primary | ICD-10-CM

## 2021-04-27 PROCEDURE — 0002A SARS-COV-2 / COVID-19 MRNA VACCINE (PFIZER-BIONTECH) 30 MCG: CPT

## 2021-04-27 PROCEDURE — 91300 SARS-COV-2 / COVID-19 MRNA VACCINE (PFIZER-BIONTECH) 30 MCG: CPT
